# Patient Record
Sex: FEMALE | Race: WHITE | NOT HISPANIC OR LATINO | URBAN - METROPOLITAN AREA
[De-identification: names, ages, dates, MRNs, and addresses within clinical notes are randomized per-mention and may not be internally consistent; named-entity substitution may affect disease eponyms.]

---

## 2017-09-08 ENCOUNTER — OUTPATIENT (OUTPATIENT)
Dept: OUTPATIENT SERVICES | Facility: HOSPITAL | Age: 62
LOS: 1 days | Discharge: HOME | End: 2017-09-08

## 2017-09-08 DIAGNOSIS — R03.0 ELEVATED BLOOD-PRESSURE READING, WITHOUT DIAGNOSIS OF HYPERTENSION: ICD-10-CM

## 2017-09-08 DIAGNOSIS — Z80.9 FAMILY HISTORY OF MALIGNANT NEOPLASM, UNSPECIFIED: ICD-10-CM

## 2017-09-28 PROBLEM — Z00.00 ENCOUNTER FOR PREVENTIVE HEALTH EXAMINATION: Status: ACTIVE | Noted: 2017-09-28

## 2021-04-15 ENCOUNTER — LABORATORY RESULT (OUTPATIENT)
Age: 66
End: 2021-04-15

## 2021-04-15 ENCOUNTER — APPOINTMENT (OUTPATIENT)
Dept: OBGYN | Facility: CLINIC | Age: 66
End: 2021-04-15
Payer: COMMERCIAL

## 2021-04-15 VITALS
HEIGHT: 62 IN | SYSTOLIC BLOOD PRESSURE: 130 MMHG | BODY MASS INDEX: 21.16 KG/M2 | DIASTOLIC BLOOD PRESSURE: 70 MMHG | WEIGHT: 115 LBS

## 2021-04-15 DIAGNOSIS — Z01.419 ENCOUNTER FOR GYNECOLOGICAL EXAMINATION (GENERAL) (ROUTINE) W/OUT ABNORMAL FINDINGS: ICD-10-CM

## 2021-04-15 DIAGNOSIS — Z12.39 ENCOUNTER FOR OTHER SCREENING FOR MALIGNANT NEOPLASM OF BREAST: ICD-10-CM

## 2021-04-15 PROCEDURE — 99072 ADDL SUPL MATRL&STAF TM PHE: CPT

## 2021-04-15 PROCEDURE — 99386 PREV VISIT NEW AGE 40-64: CPT

## 2021-04-15 PROCEDURE — 81002 URINALYSIS NONAUTO W/O SCOPE: CPT

## 2021-04-16 PROBLEM — Z01.419 ENCOUNTER FOR ROUTINE GYNECOLOGICAL EXAMINATION WITH PAPANICOLAOU SMEAR OF CERVIX: Status: RESOLVED | Noted: 2021-04-16 | Resolved: 2021-04-30

## 2021-04-16 NOTE — HISTORY OF PRESENT ILLNESS
[FreeTextEntry1] : 65 yo  with LMP age 57 for new gyn visit.\par Menarche 15\par Menopaause 57\par \par Has used natural HRT in past. Soy in diet. Interested in natural form of estrogen.\par Last sexually active several months ago.  No PMB, discharge nor pain.\par Never used topical estrogen nor HRT.\par \par Ob:\par   5#9oz F\par  [Mammogramdate] : 2020 [ColonoscopyDate] : no

## 2021-04-16 NOTE — PHYSICAL EXAM
[Appropriately responsive] : appropriately responsive [Alert] : alert [No Acute Distress] : no acute distress [No Lymphadenopathy] : no lymphadenopathy [No Murmurs] : no murmurs [Soft] : soft [Non-tender] : non-tender [Non-distended] : non-distended [No Lesions] : no lesions [No HSM] : No HSM [No Mass] : no mass [Oriented x3] : oriented x3 [Examination Of The Breasts] : a normal appearance [No Masses] : no breast masses were palpable [Labia Minora] : normal [Labia Majora] : normal [Normal] : normal [Nl Sphincter Tone] : normal sphincter tone [Uterine Adnexae] : normal [FreeTextEntry9] : KEISHA neg

## 2021-04-16 NOTE — DISCUSSION/SUMMARY
[FreeTextEntry1] : Health Maintenance:\par -Pap with HPV\par -Mammo Rx\par -TBSE\par -colonoscopy guidelines reviewed with patient\par -Achieve Vit D levels of 30-40, intake of 1100 mg daily calcium mostly thru dark green\par leafy greens and milk products, exercise 30 minutes TIW\par \par Atrophic vaginitis:\par -no severe features, as in no bleeding with pap\par -suggest lubricants both water and oil based\par -timing of topical estrogen, R/b/a d/w pt today.\par -if interested in HRT or vaginall estrogen RTO at that time.

## 2021-04-17 LAB
BILIRUB UR QL STRIP: NORMAL
GLUCOSE UR-MCNC: NORMAL
HCG UR QL: 0.2 EU/DL
HGB UR QL STRIP.AUTO: NORMAL
HPV HIGH+LOW RISK DNA PNL CVX: DETECTED
KETONES UR-MCNC: NORMAL
LEUKOCYTE ESTERASE UR QL STRIP: NORMAL
NITRITE UR QL STRIP: NORMAL
PH UR STRIP: 5
PROT UR STRIP-MCNC: NORMAL
SP GR UR STRIP: 1.01

## 2021-10-08 ENCOUNTER — EMERGENCY (EMERGENCY)
Facility: HOSPITAL | Age: 66
LOS: 1 days | Discharge: HOME | End: 2021-10-08
Attending: EMERGENCY MEDICINE | Admitting: EMERGENCY MEDICINE
Payer: COMMERCIAL

## 2021-10-08 VITALS
DIASTOLIC BLOOD PRESSURE: 94 MMHG | RESPIRATION RATE: 16 BRPM | TEMPERATURE: 98 F | SYSTOLIC BLOOD PRESSURE: 166 MMHG | HEART RATE: 51 BPM | HEIGHT: 62 IN | WEIGHT: 115.08 LBS | OXYGEN SATURATION: 100 %

## 2021-10-08 VITALS
TEMPERATURE: 99 F | DIASTOLIC BLOOD PRESSURE: 84 MMHG | SYSTOLIC BLOOD PRESSURE: 158 MMHG | RESPIRATION RATE: 18 BRPM | OXYGEN SATURATION: 98 % | HEART RATE: 60 BPM

## 2021-10-08 PROCEDURE — 99283 EMERGENCY DEPT VISIT LOW MDM: CPT

## 2021-10-08 RX ORDER — ERYTHROMYCIN BASE 5 MG/GRAM
1 OINTMENT (GRAM) OPHTHALMIC (EYE)
Qty: 3.5 | Refills: 0
Start: 2021-10-08 | End: 2021-10-14

## 2021-10-08 RX ORDER — OFLOXACIN 0.3 %
1 DROPS OPHTHALMIC (EYE)
Qty: 5 | Refills: 0
Start: 2021-10-08 | End: 2021-10-14

## 2021-10-08 NOTE — ED PROVIDER NOTE - NSFOLLOWUPCLINICS_GEN_ALL_ED_FT
[facility] - Ophthalmology  Ophthalmology  [address], [address]  [place] [address]  Phone: [phone]  Fax:   Follow Up Time: 1-3 Days     [facility] - Ophthalmology  Ophthalmology  [address], [address]  [place] [address]  Phone: [phone]  Fax:   Follow Up Time: 1-3 Days    [facility] Ophthalmology  Ophthalmology  [address], [address]  [place] [address]  Phone: [phone]  Fax:   Follow Up Time: 1-3 Days

## 2021-10-08 NOTE — ED PROVIDER NOTE - OBJECTIVE STATEMENT
66 year old female with history of IBS presents with eye redness and irritation that started this afternoon around 2:30 pm. states started shortly after gardening. believes may have got something in her eye "like mothballs that her sister put around the garden and may have been in the soil" but not certain. no known injury or trauma.  did not irrigate her eyes. denies modifying factors. slight blurred vision from the irritation. does not wear contacts. no HA, dizziness, or photophobia.   PCP Deb Da Silva NP

## 2021-10-08 NOTE — ED ADULT NURSE NOTE - CHPI ED NUR SYMPTOMS NEG
no blurred vision/no discharge/no double vision/no drainage/no eye lid swelling/no foreign body/no pain/no photophobia/no purulent drainage

## 2021-10-08 NOTE — ED PROVIDER NOTE - PATIENT PORTAL LINK FT
You can access the FollowMyHealth Patient Portal offered by [facility] by registering at the following website: [email or web address]. By joining [employer]s FollowMyHealth portal, you will also be able to view your health information using other applications (apps) compatible with our system.

## 2021-10-08 NOTE — ED PROVIDER NOTE - PROGRESS NOTE DETAILS
Alvin Marques for attending Dr. Quintanilla   65 y/o female with PMHx of IBS presents to the ED c/o eye pain and swelling. Patient reports gardening at 2;30, planting tulips and eyes were burning, which has continued. Pt states there were mothballs in the garden.  Pt relates wears glasses normally NKDA . Pt c/o throbbing pain, nausea. Denies itching. No tobacco use.  Pt c/o blurred vision. [facility] Alvin Marques for attending Dr. Quintanilla   65 y/o female with PMHx of IBS presents to the ED c/o eye pain and swelling. Patient reports gardening at 2;30, planting tulips and eyes were burning, which has continued. Pt states there were mothballs in the garden.  Pt relates wearing glasses normally. NKDA . Pt c/o throbbing pain, nausea, blurred vision. Denies itching. No tobacco use.  NP: Nikki Green- [place]. Alvin Marques for attending Dr. Quintanilla   65 y/o female with PMHx of IBS presents to the ED c/o burning and throbbing pain, and redness to left eye. began while gardening. Pt relates at approximately 2:30 afternoon while gardening at sister's house. Pt relates she does not recall rubbing her eye, but cannot definitively say she didn't. Patient reports similar but milder symptoms in right eye. Pt doesn't wear contacts. No fever, chills, vomiting or any other complaints.  Exam: Well developed, well nourished , NAD, PERRL, EOMI, cornea clear b/l , no abrasion, no foreign body, left eye positive +conjunctiva erythema , anterior chambers clear b/l , IOP less than 20 b/l eyes. no swelling erythema to lids, periorbital vision Alvin Marques for attending Dr. Quintanilla   67 y/o female with PMHx of IBS presents to the ED c/o burning and throbbing pain, and redness to left eye. began while gardening at approximately 2:30 afternoon while gardening at sister's house. Pt relates she does not recall rubbing her eye, but cannot definitively say she didn't. Patient reports similar but milder symptoms in right eye. Pt doesn't wear contacts. No fever, chills, ha, vomiting or any other complaints.  Exam: Well developed, well nourished , NAD, PERRL, EOMI, cornea clear b/l , no abrasion, no foreign body, left eye +conjunctiva erythema , anterior chambers clear b/l , IOP right eye 12, left eye 16. no swelling erythema to lids or periorbital region Reevaluated patient at bedside.  Patient feeling improved.  Dr. Quintanilla spoke with opthalmology resident. will see in office tomorrow. would like patient started on ofloxacin and erythromycin ointment at night.  An opportunity to ask questions was given.  Discussed the importance of prompt, close medical follow-up.  Patient will return with any changes, concerns or persistent / worsening symptoms.  Understanding of all instructions verbalized.

## 2021-10-08 NOTE — ED PROVIDER NOTE - CLINICAL SUMMARY MEDICAL DECISION MAKING FREE TEXT BOX
eye redness and irritation started today. no evidence of abrasion or FB on exam. suspect chemical conjunctivitis. no evidence of glaucoma. will start on abx drops and ointment. opthalmology follow up

## 2021-10-08 NOTE — ED PROVIDER NOTE - NSFOLLOWUPINSTRUCTIONS_ED_ALL_ED_FT
continue to irrigate eyes with water  ofloxacin eye drops to bilateral eyes four times a day   erythromycin eye ointment to both eyes before beds  follow up with opthalmology clinic tomorrow as arranged. they will contact you       Chemical Conjunctivitis, Adult       Chemical conjunctivitis is irritation and swelling (inflammation) in your eye. It is also called chemical pink eye. This condition happens when a chemical gets in your eye. The condition makes your eye red, pink, and itchy.    You can get this this condition in one eye or both eyes. You cannot spread this condition to another person.      Follow these instructions at home:    •Take or apply medicines only as told by your doctor.      •If you were prescribed an antibiotic medicine, take or apply it as told by your doctor. Do not stop using the medicine even if you start to feel better.    •Until your eye is back to normal:  •Do not wear contacts. Wear glasses instead.      •Do not wear eye makeup.      •Do not touch or rub your eyes.        •Put a cool, clean washcloth (compress) on your eye for 10–20 minutes. Do this 3–4 times a day.      •Avoid being around the chemical or the environment that caused the irritation. Wear eye protection if you need to.      •Wash your hands with soap and water before you use eye drops or you put a cool washcloth on your eyes. If you cannot use soap and water use hand .        Contact a doctor if:    •Your symptoms do not get better.      •Your symptoms get worse.      •You have new symptoms.      •Your pain gets worse.      •You have pus in your eye.        Get help right away if:    •Your vision suddenly gets worse.        Summary    •Chemical conjunctivitis is irritation and swelling (inflammation) in your eye. It is also called chemical pink eye.      •This condition happens when a chemical gets in your eye.      •Take or apply medicines only as told by your doctor.      •Put a cool, clean washcloth on your eye for 10–20 minutes. Do this 3–4 times each day.      •Until your eye is back to normal, do not wear contacts or eye makeup and do not touch or rub your eyes.      This information is not intended to replace advice given to you by your health care provider. Make sure you discuss any questions you have with your health care provider.

## 2021-10-08 NOTE — ED ADULT NURSE NOTE - OBJECTIVE STATEMENT
pt is A&Ox4, presented to the ER for L eye swelling and redness post gardening, pt denies any headache, blurry vision at this moment, resp even and unlabored, nad noted, will continue to monitor

## 2021-10-08 NOTE — ED PROVIDER NOTE - EYES, MLM
mild left conjunctival injection. no right conjunctival injection. eyelids everted and swept, no FB. no evidence of abrasion or FB. IOP on right 12, IOP on left 16. ph neutral. VA both 20/25, left 20/20, right 20/25.  pupils equal, round and reactive to light.

## 2021-10-08 NOTE — ED ADULT TRIAGE NOTE - CHIEF COMPLAINT QUOTE
"I was doing gardening today a few hours ago and there were mothballs in the soil. I touched my eyes and now they are throbbing, red and burning left more than right and my vision is blurry in the left eye."

## 2021-10-08 NOTE — ED PROVIDER NOTE - NSFOLLOWUPCLINICSTOKEN_GEN_ALL_ED_FT
323778:1-3 Days|| ||00\01||False; [id number]:1-3 Days|| ||00\01||False;[id number]:1-3 Days|| ||00\01||False;

## 2021-10-09 NOTE — CHART NOTE - NSCHARTNOTEFT_GEN_A_CORE
Called patient at 10am. Offered to see pt in clinic today however she deferred, reporting she feels much improved today and her eyes are back to normal. Recommended pt use ofloxacin gtts and erythromycin ointment for couple days duration, and if any further symptoms to go to ED or to call the office.     Carmela Herndon MD PGY-2  [phone]  Also available on [employer]

## 2021-12-04 NOTE — ED ADULT NURSE NOTE - ED COMFORT CARE
Problem: Pain  Goal: *Control of Pain  Outcome: Progressing Towards Goal     Problem: Patient Education: Go to Patient Education Activity  Goal: Patient/Family Education  Outcome: Progressing Towards Goal     Problem: Pain  Goal: *Control of Pain  Outcome: Progressing Towards Goal  Goal: *PALLIATIVE CARE:  Alleviation of Pain  Outcome: Progressing Towards Goal     Problem: Patient Education: Go to Patient Education Activity  Goal: Patient/Family Education  Outcome: Progressing Towards Goal     Problem: Falls - Risk of  Goal: *Absence of Falls  Description: Document Shelby Fall Risk and appropriate interventions in the flowsheet. Outcome: Progressing Towards Goal  Note: Fall Risk Interventions:  Mobility Interventions: Bed/chair exit alarm    Mentation Interventions: Adequate sleep, hydration, pain control    Medication Interventions: Bed/chair exit alarm    Elimination Interventions: Bed/chair exit alarm    History of Falls Interventions: Bed/chair exit alarm         Problem: Emotional Support Needs  Goal: Patient/family is receiving emotional support  Outcome: Progressing Towards Goal     Problem: Pressure Injury - Risk of  Goal: *Prevention of pressure injury  Description: Document Waqas Scale and appropriate interventions in the flowsheet.   Outcome: Progressing Towards Goal Patient informed